# Patient Record
Sex: FEMALE | Race: WHITE | NOT HISPANIC OR LATINO | Employment: FULL TIME | URBAN - METROPOLITAN AREA
[De-identification: names, ages, dates, MRNs, and addresses within clinical notes are randomized per-mention and may not be internally consistent; named-entity substitution may affect disease eponyms.]

---

## 2024-07-12 ENCOUNTER — APPOINTMENT (OUTPATIENT)
Dept: OPHTHALMOLOGY | Facility: CLINIC | Age: 64
End: 2024-07-12
Payer: COMMERCIAL

## 2024-07-19 ENCOUNTER — APPOINTMENT (OUTPATIENT)
Dept: OPHTHALMOLOGY | Facility: CLINIC | Age: 64
End: 2024-07-19
Payer: COMMERCIAL

## 2024-07-19 DIAGNOSIS — H31.002 RETINAL SCAR OF LEFT EYE: ICD-10-CM

## 2024-07-19 DIAGNOSIS — H26.492 PCO (POSTERIOR CAPSULAR OPACIFICATION), LEFT: ICD-10-CM

## 2024-07-19 DIAGNOSIS — H33.322 ROUND HOLE OF LEFT RETINA WITHOUT DETACHMENT: ICD-10-CM

## 2024-07-19 DIAGNOSIS — H17.9 CORNEAL SCAR, RIGHT EYE: ICD-10-CM

## 2024-07-19 DIAGNOSIS — H18.603 KERATOCONUS OF BOTH EYES: Primary | ICD-10-CM

## 2024-07-19 DIAGNOSIS — H43.812 PVD (POSTERIOR VITREOUS DETACHMENT), LEFT EYE: ICD-10-CM

## 2024-07-19 DIAGNOSIS — Z96.1 PSEUDOPHAKIA OF BOTH EYES: ICD-10-CM

## 2024-07-19 DIAGNOSIS — H52.31 ANISOMETROPIA: ICD-10-CM

## 2024-07-19 LAB
KMAX (OD): 62.1 DIOPTERS
KMAX (OS): 50.6 DIOPTERS
PACH THINNEST (OD): 390 MICRONS
PACH THINNEST (OS): 490 MICRONS
SIMK FLAT (OD): 54.2 DIOPTERS
SIMK FLAT (OS): 44.7 DIOPTERS
SIMK STEEP (OD): 56.7 DIOPTERS
SIMK STEEP (OS): 48.6 DIOPTERS

## 2024-07-19 PROCEDURE — 92310 CONTACT LENS FITTING OU: CPT | Performed by: OPTOMETRIST

## 2024-07-19 PROCEDURE — 92014 COMPRE OPH EXAM EST PT 1/>: CPT | Performed by: OPTOMETRIST

## 2024-07-19 PROCEDURE — 92025 CPTRIZED CORNEAL TOPOGRAPHY: CPT | Performed by: OPTOMETRIST

## 2024-07-19 RX ORDER — FLUCONAZOLE 150 MG/1
150 TABLET ORAL ONCE
COMMUNITY
Start: 2024-07-05

## 2024-07-19 RX ORDER — CHOLECALCIFEROL (VITAMIN D3) 50 MCG
2000 TABLET ORAL
COMMUNITY

## 2024-07-19 RX ORDER — LISINOPRIL AND HYDROCHLOROTHIAZIDE 20; 25 MG/1; MG/1
1 TABLET ORAL
COMMUNITY
Start: 2024-06-24

## 2024-07-19 RX ORDER — METRONIDAZOLE 500 MG/1
1 TABLET ORAL
COMMUNITY
Start: 2024-07-10

## 2024-07-19 ASSESSMENT — TONOMETRY
OS_IOP_MMHG: 11
OD_IOP_MMHG: 12
IOP_METHOD: GOLDMANN APPLANATION

## 2024-07-19 ASSESSMENT — CONF VISUAL FIELD
OS_SUPERIOR_NASAL_RESTRICTION: 0
OD_NORMAL: 1
OD_INFERIOR_TEMPORAL_RESTRICTION: 0
OD_SUPERIOR_TEMPORAL_RESTRICTION: 0
OS_INFERIOR_TEMPORAL_RESTRICTION: 0
OD_SUPERIOR_NASAL_RESTRICTION: 0
OS_NORMAL: 1
METHOD: COUNTING FINGERS
OS_SUPERIOR_TEMPORAL_RESTRICTION: 0
OS_INFERIOR_NASAL_RESTRICTION: 0
OD_INFERIOR_NASAL_RESTRICTION: 0

## 2024-07-19 ASSESSMENT — REFRACTION_CURRENTRX
OD_DIAMETER: 15.0
OD_BASECURVE: 7.0
OD_SPHERE: -2.25
OD_AXIS: 020
OD_AXIS: 025
OD_CYLINDER: -1.50
OD_BASECURVE: 7.0
OD_CYLINDER: -1.50
OD_SPHERE: -2.25
OD_DIAMETER: 15.0

## 2024-07-19 ASSESSMENT — VISUAL ACUITY
OS_SC: 20/30
OD_CC: 20/30
CORRECTION_TYPE: CONTACTS
VA_OR_OD_CURRENT_RX: 20/25
METHOD: SNELLEN - LINEAR
OD_CC+: +2

## 2024-07-19 ASSESSMENT — SLIT LAMP EXAM - LIDS
COMMENTS: CLEAN AND CLEAR
COMMENTS: CLEAN AND CLEAR

## 2024-07-19 ASSESSMENT — ENCOUNTER SYMPTOMS
CARDIOVASCULAR NEGATIVE: 0
NEUROLOGICAL NEGATIVE: 0
MUSCULOSKELETAL NEGATIVE: 0
GASTROINTESTINAL NEGATIVE: 0
CONSTITUTIONAL NEGATIVE: 0
ENDOCRINE NEGATIVE: 0
ALLERGIC/IMMUNOLOGIC NEGATIVE: 0
EYES NEGATIVE: 1
HEMATOLOGIC/LYMPHATIC NEGATIVE: 0
RESPIRATORY NEGATIVE: 0
PSYCHIATRIC NEGATIVE: 0

## 2024-07-19 ASSESSMENT — EXTERNAL EXAM - LEFT EYE: OS_EXAM: NORMAL

## 2024-07-19 ASSESSMENT — EXTERNAL EXAM - RIGHT EYE: OD_EXAM: NORMAL

## 2024-07-19 ASSESSMENT — CUP TO DISC RATIO
OD_RATIO: 0.4
OS_RATIO: 0.4

## 2024-07-19 NOTE — Clinical Note
Right Cortes Toric 7.0 15.0 -2.25 -1.50 020 SCR 8.2,  IT=2     Quantity: 1 Package: 3 PK Appointment needed? No Medically necessary? Yes Ship To: Home Additional instructions: order final RX above 3 pack - cannot find keratoconus soft lens charge on EPIC, please add charge

## 2024-07-19 NOTE — PROGRESS NOTES
Assessment/Plan   Diagnoses and all orders for this visit:  Keratoconus of both eyes  -     Corneal Topography - OU - Both Eyes  Round hole of left retina without detachment  Pseudophakia of both eyes  Retinal scar of left eye  PVD (posterior vitreous detachment), left eye  Corneal scar, right eye  Anisometropia  PCO (posterior capsular opacification), left    Old retinal hole Left eye sealed by Dr Asher but there are possible two new asymptomatic holes Left eye with out detachment- refer back to Dr Asher for consultation within next 2-3 weeks, sooner and ASAP if any new flashes or floaters    Reorder Right eye CL with slight axis shift, Mail to patient

## 2024-08-05 ENCOUNTER — APPOINTMENT (OUTPATIENT)
Dept: OPHTHALMOLOGY | Facility: CLINIC | Age: 64
End: 2024-08-05
Payer: COMMERCIAL

## 2024-08-05 DIAGNOSIS — H33.322 ROUND HOLE OF LEFT RETINA WITHOUT DETACHMENT: Primary | ICD-10-CM

## 2024-08-05 PROCEDURE — 92134 CPTRZ OPH DX IMG PST SGM RTA: CPT | Mod: BILATERAL PROCEDURE | Performed by: OPHTHALMOLOGY

## 2024-08-05 PROCEDURE — 99214 OFFICE O/P EST MOD 30 MIN: CPT | Performed by: OPHTHALMOLOGY

## 2024-08-05 ASSESSMENT — CONF VISUAL FIELD
OS_INFERIOR_TEMPORAL_RESTRICTION: 0
OS_INFERIOR_NASAL_RESTRICTION: 0
OD_SUPERIOR_NASAL_RESTRICTION: 0
OD_INFERIOR_TEMPORAL_RESTRICTION: 0
OD_INFERIOR_NASAL_RESTRICTION: 0
OS_NORMAL: 1
OS_SUPERIOR_TEMPORAL_RESTRICTION: 0
OD_NORMAL: 1
OS_SUPERIOR_NASAL_RESTRICTION: 0
OD_SUPERIOR_TEMPORAL_RESTRICTION: 0

## 2024-08-05 ASSESSMENT — VISUAL ACUITY
OD_CC: 20/30+2 CL
METHOD: SNELLEN - LINEAR
OS_CC: 20/30

## 2024-08-05 ASSESSMENT — ENCOUNTER SYMPTOMS
CARDIOVASCULAR NEGATIVE: 1
EYES NEGATIVE: 1

## 2024-08-05 ASSESSMENT — SLIT LAMP EXAM - LIDS
COMMENTS: GOOD POSITION
COMMENTS: GOOD POSITION

## 2024-08-05 ASSESSMENT — TONOMETRY: IOP_METHOD: GOLDMANN APPLANATION

## 2024-08-05 ASSESSMENT — EXTERNAL EXAM - LEFT EYE: OS_EXAM: NORMAL

## 2024-08-05 ASSESSMENT — EXTERNAL EXAM - RIGHT EYE: OD_EXAM: NORMAL

## 2024-08-05 ASSESSMENT — CUP TO DISC RATIO
OD_RATIO: 0.4
OS_RATIO: 0.4

## 2024-08-05 NOTE — PROGRESS NOTES
Assessment/Plan   Diagnoses and all orders for this visit:  Keratoconus of both eyes  -     Corneal Topography - OU - Both Eyes  Round hole of left retina without detachment  Pseudophakia of both eyes  Retinal scar of left eye  PVD (posterior vitreous detachment), left eye  Corneal scar, right eye  Anisometropia  PCO (posterior capsular opacification), left    Old retinal hole Left eye sealed by Dr Asher but there are possible two new asymptomatic holes  9as per Dr Mosher      Will need laser retinopexy at 2 oclock  hole that is new     DIAGNOSTIC PROCEDURE DONE    OCT DONE OD/OS            REASON FOR TEST: will help address and tailor  therapy by detecting subclinical CME SRF     Hi quality OCT  scans obtained  signal good    OCT OD - Normal Foveal Contour, No Edema, IS/OS Junction Normal  OCT OS - Normal Foveal Contour, No Edema, IS/OS Junction Normal    additional commnents:      1 week laser left eye

## 2024-08-15 ENCOUNTER — APPOINTMENT (OUTPATIENT)
Dept: OPHTHALMOLOGY | Facility: CLINIC | Age: 64
End: 2024-08-15
Payer: COMMERCIAL

## 2024-08-15 DIAGNOSIS — H31.002 RETINAL SCAR OF LEFT EYE: ICD-10-CM

## 2024-08-15 DIAGNOSIS — H43.812 PVD (POSTERIOR VITREOUS DETACHMENT), LEFT EYE: ICD-10-CM

## 2024-08-15 DIAGNOSIS — H33.322 ROUND HOLE OF LEFT RETINA WITHOUT DETACHMENT: Primary | ICD-10-CM

## 2024-08-15 PROCEDURE — 92134 CPTRZ OPH DX IMG PST SGM RTA: CPT | Performed by: OPHTHALMOLOGY

## 2024-08-15 PROCEDURE — 67145 PROPH RTA DTCHMNT PC: CPT | Mod: LEFT SIDE | Performed by: OPHTHALMOLOGY

## 2024-08-15 ASSESSMENT — CUP TO DISC RATIO
OD_RATIO: 0.4
OS_RATIO: 0.4

## 2024-08-15 ASSESSMENT — TONOMETRY
IOP_METHOD: GOLDMANN APPLANATION
OS_IOP_MMHG: 14
OD_IOP_MMHG: DEFERS

## 2024-08-15 ASSESSMENT — SLIT LAMP EXAM - LIDS
COMMENTS: GOOD POSITION
COMMENTS: GOOD POSITION

## 2024-08-15 ASSESSMENT — EXTERNAL EXAM - LEFT EYE: OS_EXAM: NORMAL

## 2024-08-15 ASSESSMENT — VISUAL ACUITY
CORRECTION_TYPE: GLASSES
OS_SC: 20/30-1
OD_CC: 20/30-2
METHOD: SNELLEN - LINEAR

## 2024-08-15 ASSESSMENT — EXTERNAL EXAM - RIGHT EYE: OD_EXAM: NORMAL

## 2024-08-15 NOTE — PROGRESS NOTES
Assessment/Plan   Diagnoses and all orders for this visit:  Keratoconus of both eyes  -     Corneal Topography - OU - Both Eyes  Round hole of left retina without detachment  Pseudophakia of both eyes  Retinal scar of left eye  PVD (posterior vitreous detachment), left eye  Corneal scar, right eye  Anisometropia  PCO (posterior capsular opacification), left    Old retinal hole Left eye sealed by Dr Asher but there are possible two new asymptomatic holes  9as per Dr Mosher      Will need laser retinopexy at 2 oclock  hole that is new     DIAGNOSTIC PROCEDURE DONE    OCT DONE OD/OS     REASON FOR TEST: will help address and tailor  therapy by detecting subclinical CME SRF     Hi quality OCT  scans obtained  signal good    OCT OD - Normal Foveal Contour, No Edema, IS/OS Junction Normal  OCT OS - Normal Foveal Contour, No Edema, IS/OS Junction Normal    laser retinopexy left eye today    51021 Laser retinopexy  H33.312 retinal tear OS  Slit lamp    RE  sanjiv 300 mw  0.2 sec duration interval  23 spots     Fu 3m

## 2024-11-12 ENCOUNTER — APPOINTMENT (OUTPATIENT)
Dept: OPHTHALMOLOGY | Facility: CLINIC | Age: 64
End: 2024-11-12
Payer: COMMERCIAL

## 2024-11-12 DIAGNOSIS — H33.322 ROUND HOLE OF LEFT RETINA WITHOUT DETACHMENT: ICD-10-CM

## 2024-11-12 DIAGNOSIS — H31.002 RETINAL SCAR OF LEFT EYE: Primary | ICD-10-CM

## 2024-11-12 PROCEDURE — 99213 OFFICE O/P EST LOW 20 MIN: CPT | Performed by: OPHTHALMOLOGY

## 2024-11-12 PROCEDURE — 92134 CPTRZ OPH DX IMG PST SGM RTA: CPT | Performed by: OPHTHALMOLOGY

## 2024-11-12 ASSESSMENT — CONF VISUAL FIELD
OD_SUPERIOR_TEMPORAL_RESTRICTION: 0
OS_SUPERIOR_TEMPORAL_RESTRICTION: 0
OS_INFERIOR_NASAL_RESTRICTION: 0
OS_NORMAL: 1
OS_INFERIOR_TEMPORAL_RESTRICTION: 0
OD_INFERIOR_NASAL_RESTRICTION: 0
OD_NORMAL: 1
OD_SUPERIOR_NASAL_RESTRICTION: 0
OD_INFERIOR_TEMPORAL_RESTRICTION: 0
OS_SUPERIOR_NASAL_RESTRICTION: 0

## 2024-11-12 ASSESSMENT — SLIT LAMP EXAM - LIDS
COMMENTS: GOOD POSITION
COMMENTS: GOOD POSITION

## 2024-11-12 ASSESSMENT — EXTERNAL EXAM - RIGHT EYE: OD_EXAM: NORMAL

## 2024-11-12 ASSESSMENT — ENCOUNTER SYMPTOMS: EYES NEGATIVE: 1

## 2024-11-12 ASSESSMENT — VISUAL ACUITY
OS_SC: 20/30
OD_CC: 20/30-2 CL
METHOD: SNELLEN - LINEAR

## 2024-11-12 ASSESSMENT — TONOMETRY
OD_IOP_MMHG: DEFER
OS_IOP_MMHG: 14
IOP_METHOD: GOLDMANN APPLANATION

## 2024-11-12 ASSESSMENT — EXTERNAL EXAM - LEFT EYE: OS_EXAM: NORMAL

## 2024-11-12 ASSESSMENT — CUP TO DISC RATIO
OD_RATIO: 0.4
OS_RATIO: 0.4

## 2024-11-12 NOTE — PROGRESS NOTES
Assessment/Plan   Diagnoses and all orders for this visit:  Keratoconus of both eyes  -     Corneal Topography - OU - Both Eyes  Round hole of left retina without detachment  Pseudophakia of both eyes  Retinal scar of left eye  PVD (posterior vitreous detachment), left eye  Corneal scar, right eye  Anisometropia  PCO (posterior capsular opacification), left    Old retinal hole Left eye sealed by Dr Asher but there are possible two new asymptomatic holes  9as per Dr Mosher      Will need laser retinopexy at 2 oclock  hole that is new     DIAGNOSTIC PROCEDURE DONE    OCT DONE OD/OS     REASON FOR TEST: will help address and tailor  therapy by detecting subclinical CME SRF     Hi quality OCT  scans obtained  signal good    OCT OD - Normal Foveal Contour, No Edema, IS/OS Junction Normal  OCT OS - Normal Foveal Contour, No Edema, IS/OS Junction Normal    LASER DONE 3M PRIOR STABLE os    Fu 3m

## 2024-11-21 ENCOUNTER — APPOINTMENT (OUTPATIENT)
Dept: OPHTHALMOLOGY | Facility: CLINIC | Age: 64
End: 2024-11-21
Payer: COMMERCIAL

## 2025-07-25 ENCOUNTER — APPOINTMENT (OUTPATIENT)
Dept: OPHTHALMOLOGY | Facility: CLINIC | Age: 65
End: 2025-07-25
Payer: COMMERCIAL

## 2025-07-25 DIAGNOSIS — H18.603 KERATOCONUS OF BOTH EYES: Primary | ICD-10-CM

## 2025-07-25 DIAGNOSIS — H31.002 RETINAL SCAR OF LEFT EYE: ICD-10-CM

## 2025-07-25 DIAGNOSIS — H52.31 ANISOMETROPIA: ICD-10-CM

## 2025-07-25 DIAGNOSIS — Z96.1 PSEUDOPHAKIA OF BOTH EYES: ICD-10-CM

## 2025-07-25 LAB
KMAX (OD): 64.8 DIOPTERS
KMAX (OS): 51.1 DIOPTERS
PACH THINNEST (OD): 410 MICRONS
PACH THINNEST (OS): 495 MICRONS
SIMK FLAT (OD): 54.3 DIOPTERS
SIMK FLAT (OS): 44.4 DIOPTERS
SIMK STEEP (OD): 58.3 DIOPTERS
SIMK STEEP (OS): 48.5 DIOPTERS

## 2025-07-25 ASSESSMENT — REFRACTION_CURRENTRX
OD_AXIS: 020
OD_SPHERE: -3.00
OD_DIAMETER: 15.0
OD_CYLINDER: -1.50
OD_BASECURVE: 7.0
OS_BRAND: NO CL
OD_CYLINDER: -1.50
OD_AXIS: 020
OD_BASECURVE: 7.0
OD_SPHERE: -2.25
OD_DIAMETER: 15.0

## 2025-07-25 ASSESSMENT — TONOMETRY
OD_IOP_MMHG: 13
IOP_METHOD: GOLDMANN APPLANATION
OS_IOP_MMHG: 13

## 2025-07-25 ASSESSMENT — CUP TO DISC RATIO
OS_RATIO: 0.4
OD_RATIO: 0.4

## 2025-07-25 ASSESSMENT — EXTERNAL EXAM - LEFT EYE: OS_EXAM: NORMAL

## 2025-07-25 ASSESSMENT — VISUAL ACUITY
METHOD: SNELLEN - LINEAR
OS_SC+: +2
OD_CC: 20/50
OS_SC: 20/30
VA_OR_OD_CURRENT_RX: 20/30-2
CORRECTION_TYPE: CONTACTS

## 2025-07-25 ASSESSMENT — REFRACTION_MANIFEST
METHOD_AUTOREFRACTION: 1
OD_AXIS: 030
OS_SPHERE: +0.75
OS_AXIS: 155
OD_SPHERE: -3.00
OS_CYLINDER: -3.00
OD_CYLINDER: -4.25

## 2025-07-25 ASSESSMENT — SLIT LAMP EXAM - LIDS
COMMENTS: CLEAN AND CLEAR
COMMENTS: CLEAN AND CLEAR

## 2025-07-25 ASSESSMENT — CONF VISUAL FIELD
OS_NORMAL: 1
OS_SUPERIOR_TEMPORAL_RESTRICTION: 0
OS_INFERIOR_NASAL_RESTRICTION: 0
OS_INFERIOR_TEMPORAL_RESTRICTION: 0
OD_INFERIOR_NASAL_RESTRICTION: 0
OD_SUPERIOR_NASAL_RESTRICTION: 0
OD_SUPERIOR_TEMPORAL_RESTRICTION: 0
OD_NORMAL: 1
OD_INFERIOR_TEMPORAL_RESTRICTION: 0
OS_SUPERIOR_NASAL_RESTRICTION: 0

## 2025-07-25 ASSESSMENT — EXTERNAL EXAM - RIGHT EYE: OD_EXAM: NORMAL

## 2025-07-25 NOTE — Clinical Note
Right eye (OD) Contact Lens Order  Quantity: 1 Package: VIAL Appointment needed? No Medically necessary? Yes Ship To: Home Additional instructions: order one CL only (billed 4 pack) Mail to patient Rx2 and then I will order remainder if power change is acceptable

## 2025-07-25 NOTE — PROGRESS NOTES
Assessment/Plan   Diagnoses and all orders for this visit:  Keratoconus of both eyes  -     Corneal Topography - OU - Both Eyes  Anisometropia  Pseudophakia of both eyes  Retinal scar of left eye      No new holes Left eye, continue to monitor with Dr Asher    Keratoconus stable, reorder Right eye soft contact lens (SCL) to improve distance   Order one Mail to patient then Follow up 4-5 weeks   Billed 4 pack today    No correction needed Left eye

## 2025-08-29 ENCOUNTER — APPOINTMENT (OUTPATIENT)
Dept: OPHTHALMOLOGY | Facility: CLINIC | Age: 65
End: 2025-08-29
Payer: COMMERCIAL

## 2025-08-29 DIAGNOSIS — H18.603 KERATOCONUS OF BOTH EYES: Primary | ICD-10-CM

## 2025-08-29 DIAGNOSIS — H52.31 ANISOMETROPIA: ICD-10-CM

## 2025-08-29 PROCEDURE — 99212 OFFICE O/P EST SF 10 MIN: CPT | Performed by: OPTOMETRIST

## 2025-08-29 ASSESSMENT — REFRACTION_MANIFEST
OS_AXIS: 155
OS_CYLINDER: -3.00
OD_SPHERE: -3.00
OD_AXIS: 030
OS_SPHERE: +0.75
OD_CYLINDER: -4.25

## 2025-08-29 ASSESSMENT — REFRACTION_CURRENTRX
OD_CYLINDER: -2.00
OD_SPHERE: -3.00
OD_AXIS: 015
OD_BASECURVE: 7.0
OD_CYLINDER: -1.50
OD_DIAMETER: 15.0
OD_DIAMETER: 15.0
OD_AXIS: 020
OD_BASECURVE: 7.0
OD_SPHERE: -3.00

## 2025-08-29 ASSESSMENT — ENCOUNTER SYMPTOMS
GASTROINTESTINAL NEGATIVE: 0
ALLERGIC/IMMUNOLOGIC NEGATIVE: 0
HEMATOLOGIC/LYMPHATIC NEGATIVE: 0
RESPIRATORY NEGATIVE: 0
EYES NEGATIVE: 0
MUSCULOSKELETAL NEGATIVE: 0
CONSTITUTIONAL NEGATIVE: 0
ENDOCRINE NEGATIVE: 0
CARDIOVASCULAR NEGATIVE: 0
NEUROLOGICAL NEGATIVE: 0
PSYCHIATRIC NEGATIVE: 0

## 2025-08-29 ASSESSMENT — VISUAL ACUITY
METHOD: SNELLEN - LINEAR
OD_CC: 20/40
OS_SC: 20/20
CORRECTION_TYPE: CONTACTS

## 2025-08-29 ASSESSMENT — SLIT LAMP EXAM - LIDS
COMMENTS: CLEAN AND CLEAR
COMMENTS: CLEAN AND CLEAR

## 2025-08-29 ASSESSMENT — EXTERNAL EXAM - RIGHT EYE: OD_EXAM: NORMAL

## 2025-08-29 ASSESSMENT — EXTERNAL EXAM - LEFT EYE: OS_EXAM: NORMAL

## 2025-11-20 ENCOUNTER — APPOINTMENT (OUTPATIENT)
Dept: OPHTHALMOLOGY | Facility: CLINIC | Age: 65
End: 2025-11-20
Payer: COMMERCIAL